# Patient Record
(demographics unavailable — no encounter records)

---
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
## 2025-06-11 NOTE — ASSESSMENT
[FreeTextEntry1] : weight gain. he gained 35 pounds after knee surgery and he also had a stressful year. he tends to eat with stress. he is also going to marital issues and his eating pattern has not been good and he travels for work and eats out   he is on effexor but going to change to something else for ocd he has htn bp is in good control trial contrave when off effexor  titrate up to 2 bid  try my fitness pal jennifer  only have 2500 calories per day and he will lose  1  pound a week  Labs to be drawn/ specimens obtained  at outside  lab    for evaluation of   assessed conditions - cbc cmp lipid    [id number] for physical and screening purposes.  tinea versicolor likely due to heat sweating and hygiene trial ketoconazole cream bid. shower immed after sweating. cotton shirts that whisk away moisture.